# Patient Record
Sex: FEMALE | Race: WHITE | Employment: FULL TIME | URBAN - METROPOLITAN AREA
[De-identification: names, ages, dates, MRNs, and addresses within clinical notes are randomized per-mention and may not be internally consistent; named-entity substitution may affect disease eponyms.]

---

## 2020-02-27 ENCOUNTER — OFFICE VISIT (OUTPATIENT)
Dept: SURGERY | Age: 25
End: 2020-02-27

## 2020-02-27 DIAGNOSIS — N60.11 FIBROCYSTIC BREAST CHANGES OF BOTH BREASTS: Primary | ICD-10-CM

## 2020-02-27 DIAGNOSIS — Z84.81 FAMILY HISTORY OF BRCA GENE MUTATION: ICD-10-CM

## 2020-02-27 DIAGNOSIS — Z80.3 FAMILY HISTORY OF BREAST CANCER: ICD-10-CM

## 2020-02-27 DIAGNOSIS — N60.12 FIBROCYSTIC BREAST CHANGES OF BOTH BREASTS: Primary | ICD-10-CM

## 2020-02-27 NOTE — PROGRESS NOTES
HISTORY OF PRESENT ILLNESS  Deandre Gross is a 30 y.o. female. HPI  NEW patient referred by Dr. Barbara Pires of [facility]  presents for a consult regarding genetic testing and being followed as a high risk patient. She recently found out that paternal two aunts and a cousin have a BRCA mutation. No breast complaints at this time. Family history of breast cancer -   Paternal aunt - breast cancer at 39 - known BRCA mutation  Paternal aunt - bilateral breast cancer at 45 and 55 - known BRCA mutation  Paternal cousin - breast cancer at 37  Paternal cousin - known BRCA mutation  Paternal grandfather - colon cancer  Maternal aunt - ovarian cancer    OB History    No obstetric history on file. Obstetric Comments   Menarche:  13. LMP: NA.  # of Children:  1. Age at Delivery of First Child:  23.   Hysterectomy/oophorectomy:  NO/NO. Breast Bx:  no.  Hx of Breast Feeding:  no. BCP:  yes. Hormone therapy:  no.              ROS    Physical Exam  Constitutional:       Appearance: Normal appearance. Chest:      Breasts:         Right: No mass, nipple discharge, skin change or tenderness. Left: No mass, nipple discharge, skin change or tenderness. Musculoskeletal: Normal range of motion. Comments: UE x 2   Lymphadenopathy:      Upper Body:      Right upper body: No axillary adenopathy. Left upper body: No axillary adenopathy. Neurological:      Mental Status: She is alert. Psychiatric:         Attention and Perception: Attention normal.         Mood and Affect: Mood normal.         Speech: Speech normal.         Behavior: Behavior normal.       ASSESSMENT and PLAN  Encounter Diagnoses   Name Primary?  Fibrocystic breast changes of both breasts Yes    Family history of breast cancer     Family history of BRCA gene mutation      Normal breast exam with no evidence of malignancy.   We reviewed genetic testing possibilities focusing on panel testing and on specific breast cancer causing genes.  She does not have a copy of any of the family members' genetic testing. We discussed:  - Possible results (positive for a mutation, variant of unknown significance and negative for a mutation) and reliability of these results  - What these results mean in terms of her personal risk of breast, ovarian and other cancers  - Treatment to decrease her risk (mastectomy and oophorectomy and endocrine therapy)  - Increased monitoring for cancer detection (breast imaging with mammography and MRI)  - Effect these results would have on family members (testing of children (has 1year old son) and other members of the affected side of the family including her siblings)  - Logistics of testing  - Results in the context of ability to obtain health and life insurance with these results  All the patient's questions and concerns were addressed and she elected to proceed with genetic testing - [facility] testing sent. We will follow-up when the results are available and discuss necessary follow-up. She is comfortable with this plan. All questions answered and she stated understanding. Greater than 30 minutes was spent with this patient and greater than 50% of that time was spent in face to face counseling.

## 2020-02-28 DIAGNOSIS — Z80.3 FH: BREAST CANCER: Primary | ICD-10-CM

## 2020-03-12 ENCOUNTER — TELEPHONE (OUTPATIENT)
Dept: SURGERY | Age: 25
End: 2020-03-12

## 2020-03-12 DIAGNOSIS — Z80.3 FAMILY HISTORY OF BREAST CANCER: ICD-10-CM

## 2020-03-12 DIAGNOSIS — Z15.01 BRCA GENE MUTATION POSITIVE IN FEMALE: Primary | ICD-10-CM

## 2020-03-12 DIAGNOSIS — Z15.02 BRCA GENE MUTATION POSITIVE IN FEMALE: Primary | ICD-10-CM

## 2020-03-12 DIAGNOSIS — Z15.09 BRCA GENE MUTATION POSITIVE IN FEMALE: Primary | ICD-10-CM

## 2020-03-12 NOTE — TELEPHONE ENCOUNTER
Called and spoke with patient. Reviewed genetic testing results - BRCA2 pathogenic mutation and PALB2 VUS. Discussed next steps including breast MRI/high risk screening, risk reduction with endocrine therapy and prophylactic surgery. Will have a breast MRI (given info to schedule) and will likely have a bilateral mastectomy at some point. Will refer back to Dr. Esau Talley to discuss ovarian cancer risk, screening and oophorectomy. She has a 1year old and does not plan to have additional children due to other health issues. Discussed testing of family members. Mutation is likely from paternal side and she has a brother who has the same father. Aware that he should be tested too. Will call with breast MRI results and schedule breast follow-up based on those. Mail a copy of results to patient (address confirmed) and fax a copy to Dr. Esau Talley at [facility].

## 2020-03-19 ENCOUNTER — TELEPHONE (OUTPATIENT)
Dept: SURGERY | Age: 25
End: 2020-03-19

## 2020-03-19 ENCOUNTER — DOCUMENTATION ONLY (OUTPATIENT)
Dept: SURGERY | Age: 25
End: 2020-03-19

## 2020-03-19 NOTE — TELEPHONE ENCOUNTER
Called back number listed for JULIANA. No answer. Left a message that patient is getting breast MRI for new diagnosis of a genetic mutation which places her at high risk for breast cancer - lifetime risk above 20%. Is having the breast MRI for high risk screening. Asked she call back if there were further questions.

## 2020-03-19 NOTE — PROGRESS NOTES
[id number] called requesting a call back regarding this patient. The name of the person who called is \"Q. \" She requested more information on the patient's visit for this Monday 3/23/2020. No further information was given but it appears that a MRI was ordered and scheduled. The number to reach Q is [phone].

## 2020-03-23 ENCOUNTER — TELEPHONE (OUTPATIENT)
Dept: SURGERY | Age: 25
End: 2020-03-23

## 2020-03-23 ENCOUNTER — HOSPITAL ENCOUNTER (OUTPATIENT)
Dept: MRI IMAGING | Age: 25
Discharge: HOME | End: 2020-03-23
Attending: NURSE PRACTITIONER

## 2020-03-23 DIAGNOSIS — Z15.09 BRCA GENE MUTATION POSITIVE IN FEMALE: ICD-10-CM

## 2020-03-23 DIAGNOSIS — Z15.01 BRCA GENE MUTATION POSITIVE IN FEMALE: ICD-10-CM

## 2020-03-23 DIAGNOSIS — Z80.3 FAMILY HISTORY OF BREAST CANCER: ICD-10-CM

## 2020-03-23 DIAGNOSIS — Z15.02 BRCA GENE MUTATION POSITIVE IN FEMALE: ICD-10-CM

## 2020-03-23 NOTE — TELEPHONE ENCOUNTER
Cory Claude from [facility] called because patient is there for MRI for high risk screening, and she has an IUD, but has just started spotting today. She has checked with Dr. Terry Beltran who feels like this is not the appropriate time to do a breast MRI. Wanted to make us aware. I told Cory Claude that it was absolutely fine to have the patient call when she started her period again. I asked her to let the patient know that since this is not urgent that it can be scheduled in the May/June timeframe. She will let the patient know.

## 2020-04-29 ENCOUNTER — DOCUMENTATION ONLY (OUTPATIENT)
Dept: SURGERY | Age: 25
End: 2020-04-29

## 2021-02-08 ENCOUNTER — TRANSCRIBE ORDER (OUTPATIENT)
Dept: SCHEDULING | Age: 26
End: 2021-02-08

## 2022-11-17 ENCOUNTER — HOSPITAL ENCOUNTER (EMERGENCY)
Age: 27
Discharge: HOME | End: 2022-11-18
Attending: EMERGENCY MEDICINE
Payer: COMMERCIAL

## 2022-11-17 DIAGNOSIS — E27.40 ADRENAL INSUFFICIENCY (HCC): Primary | ICD-10-CM

## 2022-11-17 LAB
ALBUMIN SERPL-MCNC: 4 G/DL (ref 3.5–5)
ALBUMIN/GLOB SERPL: 1.3 {RATIO} (ref 1.1–2.2)
ALP SERPL-CCNC: 89 U/L (ref 45–117)
ALT SERPL-CCNC: 28 U/L (ref 12–78)
ANION GAP SERPL CALC-SCNC: 9 MMOL/L (ref 5–15)
APPEARANCE UR: ABNORMAL
AST SERPL-CCNC: 20 U/L (ref 15–37)
BACTERIA URNS QL MICRO: ABNORMAL /HPF
BASOPHILS # BLD: 0 K/UL (ref 0–0.1)
BASOPHILS NFR BLD: 0 % (ref 0–1)
BILIRUB SERPL-MCNC: 0.4 MG/DL (ref 0.2–1)
BILIRUB UR QL: NEGATIVE
BUN SERPL-MCNC: 4 MG/DL (ref 6–20)
BUN/CREAT SERPL: 5 (ref 12–20)
CALCIUM SERPL-MCNC: 9.2 MG/DL (ref 8.5–10.1)
CHLORIDE SERPL-SCNC: 103 MMOL/L (ref 97–108)
CO2 SERPL-SCNC: 27 MMOL/L (ref 21–32)
COLOR UR: ABNORMAL
CREAT SERPL-MCNC: 0.86 MG/DL (ref 0.55–1.02)
DIFFERENTIAL METHOD BLD: ABNORMAL
EOSINOPHIL # BLD: 0 K/UL (ref 0–0.4)
EOSINOPHIL NFR BLD: 0 % (ref 0–7)
EPITH CASTS URNS QL MICRO: ABNORMAL /LPF
ERYTHROCYTE [DISTWIDTH] IN BLOOD BY AUTOMATED COUNT: 15.2 % (ref 11.5–14.5)
GLOBULIN SER CALC-MCNC: 3 G/DL (ref 2–4)
GLUCOSE SERPL-MCNC: 75 MG/DL (ref 65–100)
GLUCOSE UR STRIP.AUTO-MCNC: NEGATIVE MG/DL
HCT VFR BLD AUTO: 40.1 % (ref 35–47)
HGB BLD-MCNC: 12.5 G/DL (ref 11.5–16)
HGB UR QL STRIP: ABNORMAL
IMM GRANULOCYTES # BLD AUTO: 0.1 K/UL (ref 0–0.04)
IMM GRANULOCYTES NFR BLD AUTO: 1 % (ref 0–0.5)
KETONES UR QL STRIP.AUTO: NEGATIVE MG/DL
LEUKOCYTE ESTERASE UR QL STRIP.AUTO: ABNORMAL
LIPASE SERPL-CCNC: 108 U/L (ref 73–393)
LYMPHOCYTES # BLD: 2.1 K/UL (ref 0.8–3.5)
LYMPHOCYTES NFR BLD: 22 % (ref 12–49)
MCH RBC QN AUTO: 26.1 PG (ref 26–34)
MCHC RBC AUTO-ENTMCNC: 31.2 G/DL (ref 30–36.5)
MCV RBC AUTO: 83.7 FL (ref 80–99)
MONOCYTES # BLD: 0.7 K/UL (ref 0–1)
MONOCYTES NFR BLD: 7 % (ref 5–13)
NEUTS SEG # BLD: 6.4 K/UL (ref 1.8–8)
NEUTS SEG NFR BLD: 70 % (ref 32–75)
NITRITE UR QL STRIP.AUTO: NEGATIVE
NRBC # BLD: 0 K/UL (ref 0–0.01)
NRBC BLD-RTO: 0 PER 100 WBC
PH UR STRIP: 7.5 [PH] (ref 5–8)
PLATELET # BLD AUTO: 329 K/UL (ref 150–400)
PMV BLD AUTO: 11.5 FL (ref 8.9–12.9)
POTASSIUM SERPL-SCNC: 3.8 MMOL/L (ref 3.5–5.1)
PROT SERPL-MCNC: 7 G/DL (ref 6.4–8.2)
PROT UR STRIP-MCNC: NEGATIVE MG/DL
RBC # BLD AUTO: 4.79 M/UL (ref 3.8–5.2)
RBC #/AREA URNS HPF: ABNORMAL /HPF (ref 0–5)
SODIUM SERPL-SCNC: 139 MMOL/L (ref 136–145)
SP GR UR REFRACTOMETRY: 1.01 (ref 1–1.03)
UA: UC IF INDICATED,UAUC: ABNORMAL
UROBILINOGEN UR QL STRIP.AUTO: 0.2 EU/DL (ref 0.2–1)
WBC # BLD AUTO: 9.2 K/UL (ref 3.6–11)
WBC URNS QL MICRO: ABNORMAL /HPF (ref 0–4)

## 2022-11-17 PROCEDURE — 74011250636 HC RX REV CODE- 250/636: Performed by: EMERGENCY MEDICINE

## 2022-11-17 PROCEDURE — 99284 EMERGENCY DEPT VISIT MOD MDM: CPT

## 2022-11-17 PROCEDURE — 96361 HYDRATE IV INFUSION ADD-ON: CPT

## 2022-11-17 PROCEDURE — 96375 TX/PRO/DX INJ NEW DRUG ADDON: CPT

## 2022-11-17 PROCEDURE — 83690 ASSAY OF LIPASE: CPT

## 2022-11-17 PROCEDURE — 96374 THER/PROPH/DIAG INJ IV PUSH: CPT

## 2022-11-17 PROCEDURE — 96376 TX/PRO/DX INJ SAME DRUG ADON: CPT

## 2022-11-17 PROCEDURE — 81001 URINALYSIS AUTO W/SCOPE: CPT

## 2022-11-17 PROCEDURE — 80053 COMPREHEN METABOLIC PANEL: CPT

## 2022-11-17 PROCEDURE — 85025 COMPLETE CBC W/AUTO DIFF WBC: CPT

## 2022-11-17 PROCEDURE — 36415 COLL VENOUS BLD VENIPUNCTURE: CPT

## 2022-11-17 PROCEDURE — 74011250637 HC RX REV CODE- 250/637: Performed by: EMERGENCY MEDICINE

## 2022-11-17 RX ORDER — HYDROCORTISONE 10 MG/1
TABLET ORAL
COMMUNITY
Start: 2022-06-23

## 2022-11-17 RX ORDER — ONDANSETRON 2 MG/ML
4 INJECTION INTRAMUSCULAR; INTRAVENOUS ONCE
Status: COMPLETED | OUTPATIENT
Start: 2022-11-17 | End: 2022-11-17

## 2022-11-17 RX ORDER — FLUTICASONE PROPIONATE 50 MCG
2 SPRAY, SUSPENSION (ML) NASAL DAILY
COMMUNITY
Start: 2022-03-14 | End: 2023-03-14

## 2022-11-17 RX ORDER — HYDROCORTISONE SODIUM SUCCINATE 100 MG/2ML
100 INJECTION, POWDER, FOR SOLUTION INTRAMUSCULAR; INTRAVENOUS ONCE
Status: COMPLETED | OUTPATIENT
Start: 2022-11-17 | End: 2022-11-17

## 2022-11-17 RX ORDER — HYDROCODONE BITARTRATE AND ACETAMINOPHEN 5; 325 MG/1; MG/1
1 TABLET ORAL ONCE
Status: COMPLETED | OUTPATIENT
Start: 2022-11-18 | End: 2022-11-17

## 2022-11-17 RX ORDER — LEVOTHYROXINE SODIUM 88 UG/1
TABLET ORAL
COMMUNITY
Start: 2022-09-13

## 2022-11-17 RX ORDER — ONDANSETRON 2 MG/ML
4 INJECTION INTRAMUSCULAR; INTRAVENOUS ONCE
Status: COMPLETED | OUTPATIENT
Start: 2022-11-18 | End: 2022-11-17

## 2022-11-17 RX ORDER — PRAZOSIN HYDROCHLORIDE 2 MG/1
CAPSULE ORAL
COMMUNITY
Start: 2022-03-14

## 2022-11-17 RX ORDER — CLOMIPRAMINE HYDROCHLORIDE 25 MG/1
CAPSULE ORAL
COMMUNITY
Start: 2021-12-01

## 2022-11-17 RX ORDER — TRAZODONE HYDROCHLORIDE 50 MG/1
TABLET ORAL
COMMUNITY

## 2022-11-17 RX ORDER — ESOMEPRAZOLE MAGNESIUM 40 MG/1
CAPSULE, DELAYED RELEASE ORAL
COMMUNITY
Start: 2022-06-07

## 2022-11-17 RX ORDER — BUDESONIDE 0.5 MG/2ML
INHALANT ORAL
COMMUNITY
Start: 2022-05-09

## 2022-11-17 RX ORDER — LEVONORGESTREL 19.5 MG/1
INTRAUTERINE DEVICE INTRAUTERINE
COMMUNITY

## 2022-11-17 RX ORDER — LAMOTRIGINE 100 MG/1
TABLET ORAL
COMMUNITY
Start: 2022-02-17

## 2022-11-17 RX ORDER — CLOMIPRAMINE HYDROCHLORIDE 50 MG/1
CAPSULE ORAL
COMMUNITY
Start: 2022-01-26

## 2022-11-17 RX ORDER — BREXPIPRAZOLE 1 MG/1
1 TABLET ORAL DAILY
COMMUNITY
Start: 2022-10-12

## 2022-11-17 RX ORDER — FAMOTIDINE 20 MG/1
TABLET, FILM COATED ORAL
COMMUNITY
Start: 2022-07-15

## 2022-11-17 RX ORDER — SEMAGLUTIDE 2.4 MG/.75ML
INJECTION, SOLUTION SUBCUTANEOUS
COMMUNITY
Start: 2022-11-16

## 2022-11-17 RX ORDER — HYDROCORTISONE 5 MG/1
TABLET ORAL
COMMUNITY
Start: 2022-10-27

## 2022-11-17 RX ADMIN — ONDANSETRON HYDROCHLORIDE 4 MG: 2 SOLUTION INTRAMUSCULAR; INTRAVENOUS at 21:32

## 2022-11-17 RX ADMIN — ONDANSETRON 4 MG: 2 INJECTION INTRAMUSCULAR; INTRAVENOUS at 23:38

## 2022-11-17 RX ADMIN — HYDROCODONE BITARTRATE AND ACETAMINOPHEN 1 TABLET: 5; 325 TABLET ORAL at 23:36

## 2022-11-17 RX ADMIN — SODIUM CHLORIDE 1000 ML: 9 INJECTION, SOLUTION INTRAVENOUS at 21:30

## 2022-11-17 RX ADMIN — HYDROCORTISONE SODIUM SUCCINATE 100 MG: 100 INJECTION, POWDER, FOR SOLUTION INTRAMUSCULAR; INTRAVENOUS at 22:07

## 2022-11-18 VITALS
DIASTOLIC BLOOD PRESSURE: 62 MMHG | RESPIRATION RATE: 15 BRPM | SYSTOLIC BLOOD PRESSURE: 110 MMHG | HEIGHT: 61 IN | WEIGHT: 176.37 LBS | BODY MASS INDEX: 33.3 KG/M2 | HEART RATE: 74 BPM | TEMPERATURE: 98 F | OXYGEN SATURATION: 96 %

## 2022-11-18 NOTE — ED TRIAGE NOTES
Pt reports she has a hx of adrenal insufficiency, has been vomiting since noon, reporting fatigue. Unable to keep steroids down. Sent by provider.

## 2022-11-18 NOTE — ED PROVIDER NOTES
29-year-old female with history of adrenal insufficiency presents today with nausea and vomiting and concern for adrenal crisis. She was unable to take her hydrocortisone because of nausea vomiting. She has some diffuse urinary symptoms but is otherwise stable. The history is provided by the patient. Vomiting   This is a new problem. The current episode started 6 to 12 hours ago. The problem occurs 2 to 4 times per day. The problem has not changed since onset. The emesis has an appearance of stomach contents. There has been no fever. Associated symptoms include abdominal pain. Pertinent negatives include no chills, no fever, no sweats, no diarrhea, no headaches, no myalgias, no cough and no headaches. The patient is not pregnant. Her pertinent negatives include no irritable bowel syndrome, no inflammatory bowel disease, no short gut syndrome, no bowel resection, no recent abdominal surgery, no malabsorption and . Past Medical History:   Diagnosis Date    Ad[facility] insufficiency ([facility]     Bipolar 1 disorder ([facility]     Hypop[facility]uitarism ([facility]     Hypothyroid        No past surgical history on file. No family history on file.     Social History     Socioeconomic History    Marital status:      Spouse name: Not on file    Number of children: Not on file    Years of education: Not on file    Highest education level: Not on file   Occupational History    Not on file   Tobacco Use    Smoking status: Never    Smokeless tobacco: Never   Substance and Sexual Activity    Alcohol use: Yes     Comment: sometimes    Drug use: Yes     Types: Marijuana    Sexual activity: Not on file   Other Topics Concern    Not on file   Social History Narrative    Not on file     Social Determinants of Health     Financial Resource Strain: Not on file   Food Insecurity: Not on file   Transportation Needs: Not on file   Physical Activity: Not on file   Stress: Not on file   Social Connections: Not on file Intimate Partner Violence: Not on file   Housing Stability: Not on file         ALLERGIES: Patient has no known allergies. Review of Systems   Constitutional:  Negative for chills and fever. HENT:  Negative for congestion, rhinorrhea, sneezing and sore throat. Respiratory:  Negative for cough and shortness of breath. Cardiovascular:  Negative for chest pain. Gastrointestinal:  Positive for abdominal pain. Negative for diarrhea, nausea and vomiting. Musculoskeletal:  Negative for back pain, myalgias and neck stiffness. Skin:  Negative for rash. Neurological:  Negative for dizziness, weakness and headaches. All other systems reviewed and are negative. Vitals:    11/17/22 2236 11/17/22 2251 11/17/22 2321 11/17/22 2336   BP: 101/66 96/70 118/69 120/80   Pulse:       Resp:       Temp:       SpO2: 98% 96% 96% 96%   Weight:       Height:                Physical Exam  Vitals and nursing note reviewed. Constitutional:       Appearance: Normal appearance. She is well-developed. HENT:      Head: Normocephalic and atraumatic. Eyes:      Conjunctiva/sclera: Conjunctivae normal.   Cardiovascular:      Rate and Rhythm: Normal rate and regular rhythm. Pulses: Normal pulses. Heart sounds: Normal heart sounds, S1 normal and S2 normal.   Pulmonary:      Effort: Pulmonary effort is normal. No respiratory distress. Breath sounds: Normal breath sounds. No wheezing. Abdominal:      General: Bowel sounds are normal. There is no distension. Palpations: Abdomen is soft. Tenderness: There is no abdominal tenderness. There is no rebound. Musculoskeletal:         General: Normal range of motion. Cervical back: Full passive range of motion without pain, normal range of motion and neck supple. Skin:     General: Skin is warm and dry. Findings: No rash. Neurological:      Mental Status: She is alert and oriented to person, place, and time.    Psychiatric:         Speech: Speech normal.         Behavior: Behavior normal.         Thought Content: Thought content normal.         Judgment: Judgment normal.        MDM  Number of Diagnoses or Management Options  Adrenal insufficiency ([address]  Diagnosis management comments: Patient be given hydrocortisone today as well as abdominal work-up. Patient Progress  Patient progress: improved (Patient did well in the ER and was given 100 mg of Solu-Cortef and is suitable for discharged home.)    CBC WITH AUTOMATED DIFF    Collection Time: 11/17/22  9:27 PM   Result Value Ref Range    WBC 9.2 3.6 - 11.0 K/uL    RBC 4.79 3.80 - 5.20 M/uL    HGB 12.5 11.5 - 16.0 g/dL    HCT 40.1 35.0 - 47.0 %    MCV 83.7 80.0 - 99.0 FL    MCH 26.1 26.0 - 34.0 PG    MCHC 31.2 30.0 - 36.5 g/dL    RDW 15.2 (H) 11.5 - 14.5 %    PLATELET 902 377 - 038 K/uL    MPV 11.5 8.9 - 12.9 FL    NRBC 0.0 0  WBC    ABSOLUTE NRBC 0.00 0.00 - 0.01 K/uL    NEUTROPHILS 70 32 - 75 %    LYMPHOCYTES 22 12 - 49 %    MONOCYTES 7 5 - 13 %    EOSINOPHILS 0 0 - 7 %    BASOPHILS 0 0 - 1 %    IMMATURE GRANULOCYTES 1 (H) 0.0 - 0.5 %    ABS. NEUTROPHILS 6.4 1.8 - 8.0 K/UL    ABS. LYMPHOCYTES 2.1 0.8 - 3.5 K/UL    ABS. MONOCYTES 0.7 0.0 - 1.0 K/UL    ABS. EOSINOPHILS 0.0 0.0 - 0.4 K/UL    ABS. BASOPHILS 0.0 0.0 - 0.1 K/UL    ABS. IMM. GRANS. 0.1 (H) 0.00 - 0.04 K/UL    DF AUTOMATED     METABOLIC PANEL, COMPREHENSIVE    Collection Time: 11/17/22  9:27 PM   Result Value Ref Range    Sodium 139 136 - 145 mmol/L    Potassium 3.8 3.5 - 5.1 mmol/L    Chloride 103 97 - 108 mmol/L    CO2 27 21 - 32 mmol/L    Anion gap 9 5 - 15 mmol/L    Glucose 75 65 - 100 mg/dL    BUN 4 (L) 6 - 20 MG/DL    Creatinine 0.86 0.55 - 1.02 MG/DL    BUN/Creatinine ratio 5 (L) 12 - 20      eGFR >60 >60 ml/min/1.73m2    Calcium 9.2 8.5 - 10.1 MG/DL    Bilirubin, total 0.4 0.2 - 1.0 MG/DL    ALT (SGPT) 28 12 - 78 U/L    AST (SGOT) 20 15 - 37 U/L    Alk.  phosphatase 89 45 - 117 U/L    Protein, total 7.0 6.4 - 8.2 g/dL Albumin 4.0 3.5 - 5.0 g/dL    Globulin 3.0 2.0 - 4.0 g/dL    A-G Ratio 1.3 1.1 - 2.2     LIPASE    Collection Time: 11/17/22  9:27 PM   Result Value Ref Range    Lipase 108 73 - 393 U/L   URINALYSIS W/ REFLEX CULTURE    Collection Time: 11/17/22  9:27 PM    Specimen: Urine   Result Value Ref Range    Color YELLOW/STRAW      Appearance CLOUDY (A) CLEAR      Specific gravity 1.010 1.003 - 1.030      pH (UA) 7.5 5.0 - 8.0      Protein Negative NEG mg/dL    Glucose Negative NEG mg/dL    Ketone Negative NEG mg/dL    Bilirubin Negative NEG      Blood SMALL (A) NEG      Urobilinogen 0.2 0.2 - 1.0 EU/dL    Nitrites Negative NEG      Leukocyte Esterase TRACE (A) NEG      WBC 0-4 0 - 4 /hpf    RBC 0-5 0 - 5 /hpf    Epithelial cells MANY (A) FEW /lpf    Bacteria 2+ (A) NEG /hpf    UA:UC IF INDICATED CULTURE NOT INDICATED BY UA RESULT             Procedures

## 2023-03-11 ENCOUNTER — APPOINTMENT (OUTPATIENT)
Dept: GENERAL RADIOLOGY | Age: 28
End: 2023-03-11
Attending: STUDENT IN AN ORGANIZED HEALTH CARE EDUCATION/TRAINING PROGRAM
Payer: COMMERCIAL

## 2023-03-11 ENCOUNTER — HOSPITAL ENCOUNTER (EMERGENCY)
Age: 28
Discharge: HOME | End: 2023-03-11
Attending: STUDENT IN AN ORGANIZED HEALTH CARE EDUCATION/TRAINING PROGRAM
Payer: COMMERCIAL

## 2023-03-11 VITALS
HEART RATE: 102 BPM | DIASTOLIC BLOOD PRESSURE: 74 MMHG | OXYGEN SATURATION: 99 % | RESPIRATION RATE: 18 BRPM | WEIGHT: 170 LBS | TEMPERATURE: 98.9 F | HEIGHT: 61 IN | BODY MASS INDEX: 32.1 KG/M2 | SYSTOLIC BLOOD PRESSURE: 122 MMHG

## 2023-03-11 DIAGNOSIS — R07.81 RIB PAIN ON RIGHT SIDE: Primary | ICD-10-CM

## 2023-03-11 PROCEDURE — 71101 X-RAY EXAM UNILAT RIBS/CHEST: CPT

## 2023-03-11 PROCEDURE — 99283 EMERGENCY DEPT VISIT LOW MDM: CPT

## 2023-03-11 RX ORDER — DICLOFENAC POTASSIUM 50 MG/1
50 TABLET, FILM COATED ORAL
Qty: 20 TABLET | Refills: 0 | Status: SHIPPED | OUTPATIENT
Start: 2023-03-11

## 2023-03-11 NOTE — ED TRIAGE NOTES
Pt arrives with previous fall a month ago and states that she had right rib pain that improved slowly, pt states twisting while lifting and hearing audible cracks. Pain on deep breathing.

## 2023-03-12 NOTE — ED NOTES
Pt discharged in stable condition at this time. MD/BOBBY reviewed discharge instructions, prescriptions, and follow up with patient at bedside. Pt verbalized understanding and denies any needs or questions at this time. Pt NAD on DC ambulatory to drive home.

## 2023-03-12 NOTE — ED PROVIDER NOTES
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 Patient says that she originally had similar pain 1 to 2 months ago when she fell onto her right side, said it had been improving but several days ago she twisted, felt some crackles, and had worsening of her pain. Pain is worse with movement of her torso. Minimal improvement with over-the-counter treatment at home. Pain is worse with deep breaths as well. No fevers, chills, cough, difficulty breathing, abdominal pain, urinary symptoms, bowel symptoms. The history is provided by the patient. Rib Pain  Pertinent negatives include no chest pain. Past Medical History:   Diagnosis Date    Adrenal insufficiency ([facility]     Bipolar 1 disorder ([facility]     Hypopituitarism ([facility]     Hypothyroid        No past surgical history on file. No family history on file. Social History     Socioeconomic History    Marital status:      Spouse name: Not on file    Number of children: Not on file    Years of education: Not on file    Highest education level: Not on file   Occupational History    Not on file   Tobacco Use    Smoking status: Never    Smokeless tobacco: Never   Substance and Sexual Activity    Alcohol use: Yes     Comment: sometimes    Drug use: Yes     Types: Marijuana    Sexual activity: Not on file   Other Topics Concern    Not on file   Social History Narrative    Not on file     Social Determinants of Health     Financial Resource Strain: Not on file   Food Insecurity: Not on file   Transportation Needs: Not on file   Physical Activity: Not on file   Stress: Not on file   Social Connections: Not on file   Intimate Partner Violence: Not on file   Housing Stability: Not on file         ALLERGIES: Patient has no known allergies. Review of Systems   Respiratory:  Negative for shortness of breath. Cardiovascular:  Negative for chest pain.      Vitals: 03/11/23 1542 03/11/23 1546   BP: 122/74    Pulse: (!) 102    Resp: 18    Temp:  98.9 °F (37.2 °C)   SpO2: 99%    Weight: 77.1 kg (170 lb)    Height: 5' 1\" (1.549 m)             Physical Exam  Constitutional:       General: She is not in acute distress. Appearance: She is well-developed. HENT:      Head: Normocephalic and atraumatic. Eyes:      General: No scleral icterus. Pupils: Pupils are equal, round, and reactive to light. Neck:      Trachea: No tracheal deviation. Cardiovascular:      Rate and Rhythm: Normal rate and regular rhythm. Heart sounds: No murmur heard. No friction rub. No gallop. Pulmonary:      Effort: Pulmonary effort is normal. No respiratory distress. Breath sounds: Normal breath sounds. No wheezing or rales. Abdominal:      General: Bowel sounds are normal. There is no distension. Palpations: Abdomen is soft. Tenderness: There is no abdominal tenderness. Musculoskeletal:         General: No deformity. Cervical back: Neck supple. Comments: Right lateral ribs tender to palpation. No crepitus, bruising, deformity. Skin:     General: Skin is warm and dry. Neurological:      Mental Status: She is alert and oriented to person, place, and time. Psychiatric:         Behavior: Behavior normal.        Medical Decision Making  18-year-old female presenting to the ED with rib pain. Differential includes rib fracture, rib contusion, chest wall muscle strain. X-ray negative for acute process. Patient discharged with prescription for lidocaine patches and diclofenac, will follow-up with primary care. Amount and/or Complexity of Data Reviewed  Radiology: ordered and independent interpretation performed. Details: No fracture seen on XR    Risk  OTC drugs. Prescription drug management. Procedures    DISCHARGE NOTE:  The patient has been re-evaluated and feeling much better and are stable for discharge.   All available radiology and laboratory results have been reviewed with patient and/or available family. Patient and/or family verbally conveyed their understanding and agreement of the patient's signs, symptoms, diagnosis, treatment and prognosis and additionally agree to follow-up as recommended in the discharge instructions or to return to the Emergency Department should their condition change or worsen prior to their follow-up appointment. All questions have been answered and patient and/or available family express understanding. LABORATORY RESULTS:  No results found for this or any previous visit (from the past 24 hour(s)). IMAGING RESULTS:  XR RIBS RT W PA CXR MIN 3 V    Result Date: 3/11/2023  1. No evidence of acute rib fracture. No acute cardiopulmonary process. MEDICATIONS GIVEN:  Medications - No data to display    IMPRESSION:  1. Rib pain on right side        PLAN:  Follow-up Information       Follow up With Specialties Details Why Contact Info    Lucio Chinchilla MD Family Medicine In 3 days  [address] [facility]  [phone]            Discharge Medication List as of 3/11/2023  7:12 PM        START taking these medications    Details   lidocaine HCL 4 % ptmd 1 Patch by Apply Externally route daily. , Print, Disp-20 Each, R-0      diclofenac potassium (CATAFLAM) 50 mg tablet Take 1 Tablet by mouth three (3) times daily as needed for Pain., Print, Disp-20 Tablet, R-0           CONTINUE these medications which have NOT CHANGED    Details   Rexulti 1 mg tab tablet Take 1 mg by mouth daily. , Historical Med, BEVERLY      budesonide (PULMICORT) 0.5 mg/2 mL nbsp Add 2ml to 240ml nasal saline and irrigate nose twice daily. , Historical Med      !! clomiPRAMINE (ANAFRANIL) 25 mg capsule clomipramine 25 mg capsule, Historical Med      !! clomiPRAMINE (ANAFRANIL) 50 mg capsule clomipramine 50 mg capsule   TAKE 1 CAPSULE BY MOUTH EVERY DAY AT BEDTIME, Historical Med      esomeprazole (NEXIUM) 40 mg capsule TAKE 1 CAPSULE(40 MG) BY MOUTH 1 TIME EACH DAY BEFORE BREAKFAST. DO NOT OPEN CAPSULE, Historical Med      famotidine (PEPCID) 20 mg tablet TAKE 1 TABLET(20 MG) BY MOUTH TWICE DAILY, Historical Med      fluticasone propionate (FLONASE) 50 mcg/actuation nasal spray 2 Sprays by Nasal route daily. , Historical Med      !! hydrocortisone (CORTEF) 10 mg tablet Take  by mouth., Historical Med      !! hydrocortisone (CORTEF) 5 mg tablet Take 15 mg in the morning, 10 mg in the early afternoon. Take extra if needed for illness, up to 12 tabs per day., Historical Med      lamoTRIgine (LaMICtal) 100 mg tablet lamotrigine 100 mg tablet   TAKE 1 TABLET BY MOUTH TWICE DAILY, Historical Med      levonorgestreL (Kyleena) 17.5 mcg/24 hrs (5 yrs) 19.5 mg IUD IUD Kyleena 17.5 mcg/24 hrs (5yrs) 19.5mg intrauterine device   Take by intrauterine route., Historical Med      levothyroxine (Unithroid) 88 mcg tablet Unithroid 88 mcg tablet   TAKE 1 TABLET BY MOUTH DAILY BEFORE BREAKFAST, Historical Med      prazosin (MINIPRESS) 2 mg capsule TAKE 1 CAPSULE BY MOUTH EVERY DAY AT BEDTIME, Historical Med      semaglutide, weight loss, (Wegovy) 2.4 mg/0.75 mL pnij INJECT 2.4MG UNDER THE SKIN 1 TIME PER WEEK, SAME DAY EACH WEEK, Historical Med      traZODone (DESYREL) 50 mg tablet trazodone 50 mg tablet   TAKE 1 TABLET BY MOUTH EVERY DAY AT BEDTIME, Historical Med       !! - Potential duplicate medications found. Please discuss with provider.           Signed By: Danny Lamas MD     March 11, 2023 I personally performed the service described in the documentation recorded by the scribe in my presence, and it accurately and completely records my words and actions.

## 2023-05-23 RX ORDER — LEVONORGESTREL 19.5 MG/1
INTRAUTERINE DEVICE INTRAUTERINE
COMMUNITY

## 2023-05-23 RX ORDER — CLOMIPRAMINE HYDROCHLORIDE 50 MG/1
CAPSULE ORAL
COMMUNITY
Start: 2022-01-26

## 2023-05-23 RX ORDER — CLOMIPRAMINE HYDROCHLORIDE 25 MG/1
CAPSULE ORAL
COMMUNITY
Start: 2021-12-01

## 2023-05-23 RX ORDER — HYDROCORTISONE 5 MG/1
TABLET ORAL
COMMUNITY
Start: 2022-10-27

## 2023-05-23 RX ORDER — ESOMEPRAZOLE MAGNESIUM 40 MG/1
CAPSULE, DELAYED RELEASE ORAL
COMMUNITY
Start: 2022-06-07

## 2023-05-23 RX ORDER — SEMAGLUTIDE 2.4 MG/.75ML
INJECTION, SOLUTION SUBCUTANEOUS
COMMUNITY
Start: 2022-11-16

## 2023-05-23 RX ORDER — DICLOFENAC POTASSIUM 50 MG/1
50 TABLET, FILM COATED ORAL 3 TIMES DAILY PRN
COMMUNITY
Start: 2023-03-11

## 2023-05-23 RX ORDER — LIDOCAINE 4 G/G
1 PATCH TOPICAL DAILY
COMMUNITY
Start: 2023-03-11

## 2023-05-23 RX ORDER — LAMOTRIGINE 100 MG/1
TABLET ORAL
COMMUNITY
Start: 2022-02-17

## 2023-05-23 RX ORDER — FAMOTIDINE 20 MG/1
TABLET, FILM COATED ORAL
COMMUNITY
Start: 2022-07-15

## 2023-05-23 RX ORDER — TRAZODONE HYDROCHLORIDE 50 MG/1
TABLET ORAL
COMMUNITY

## 2023-05-23 RX ORDER — HYDROCORTISONE 10 MG/1
TABLET ORAL
COMMUNITY
Start: 2022-06-23

## 2023-05-23 RX ORDER — PRAZOSIN HYDROCHLORIDE 2 MG/1
CAPSULE ORAL
COMMUNITY
Start: 2022-03-14

## 2023-05-23 RX ORDER — LEVOTHYROXINE SODIUM 88 UG/1
TABLET ORAL
COMMUNITY
Start: 2022-09-13

## 2023-05-23 RX ORDER — BUDESONIDE 0.5 MG/2ML
INHALANT ORAL
COMMUNITY
Start: 2022-05-09